# Patient Record
Sex: MALE
[De-identification: names, ages, dates, MRNs, and addresses within clinical notes are randomized per-mention and may not be internally consistent; named-entity substitution may affect disease eponyms.]

---

## 2024-02-04 ENCOUNTER — NON-APPOINTMENT (OUTPATIENT)
Age: 49
End: 2024-02-04

## 2024-02-05 ENCOUNTER — EMERGENCY (EMERGENCY)
Facility: HOSPITAL | Age: 49
LOS: 1 days | Discharge: HOME | End: 2024-02-05
Attending: EMERGENCY MEDICINE | Admitting: EMERGENCY MEDICINE
Payer: COMMERCIAL

## 2024-02-05 VITALS
HEART RATE: 86 BPM | RESPIRATION RATE: 18 BRPM | SYSTOLIC BLOOD PRESSURE: 174 MMHG | OXYGEN SATURATION: 98 % | DIASTOLIC BLOOD PRESSURE: 116 MMHG

## 2024-02-05 VITALS
OXYGEN SATURATION: 96 % | DIASTOLIC BLOOD PRESSURE: 146 MMHG | TEMPERATURE: 98 F | SYSTOLIC BLOOD PRESSURE: 222 MMHG | HEART RATE: 94 BPM | RESPIRATION RATE: 18 BRPM

## 2024-02-05 LAB
ALBUMIN SERPL ELPH-MCNC: 4.7 G/DL — SIGNIFICANT CHANGE UP (ref 3.3–5)
ALP SERPL-CCNC: 77 U/L — SIGNIFICANT CHANGE UP (ref 40–120)
ALT FLD-CCNC: 33 U/L — SIGNIFICANT CHANGE UP (ref 4–41)
ANION GAP SERPL CALC-SCNC: 12 MMOL/L — SIGNIFICANT CHANGE UP (ref 7–14)
AST SERPL-CCNC: 26 U/L — SIGNIFICANT CHANGE UP (ref 4–40)
BASOPHILS # BLD AUTO: 0.04 K/UL — SIGNIFICANT CHANGE UP (ref 0–0.2)
BASOPHILS NFR BLD AUTO: 0.6 % — SIGNIFICANT CHANGE UP (ref 0–2)
BILIRUB SERPL-MCNC: 0.4 MG/DL — SIGNIFICANT CHANGE UP (ref 0.2–1.2)
BUN SERPL-MCNC: 17 MG/DL — SIGNIFICANT CHANGE UP (ref 7–23)
CALCIUM SERPL-MCNC: 9.9 MG/DL — SIGNIFICANT CHANGE UP (ref 8.4–10.5)
CHLORIDE SERPL-SCNC: 103 MMOL/L — SIGNIFICANT CHANGE UP (ref 98–107)
CO2 SERPL-SCNC: 26 MMOL/L — SIGNIFICANT CHANGE UP (ref 22–31)
CREAT SERPL-MCNC: 0.92 MG/DL — SIGNIFICANT CHANGE UP (ref 0.5–1.3)
EGFR: 101 ML/MIN/1.73M2 — SIGNIFICANT CHANGE UP
EOSINOPHIL # BLD AUTO: 0.11 K/UL — SIGNIFICANT CHANGE UP (ref 0–0.5)
EOSINOPHIL NFR BLD AUTO: 1.7 % — SIGNIFICANT CHANGE UP (ref 0–6)
GLUCOSE SERPL-MCNC: 95 MG/DL — SIGNIFICANT CHANGE UP (ref 70–99)
HCT VFR BLD CALC: 43.6 % — SIGNIFICANT CHANGE UP (ref 39–50)
HGB BLD-MCNC: 15.2 G/DL — SIGNIFICANT CHANGE UP (ref 13–17)
IANC: 4.65 K/UL — SIGNIFICANT CHANGE UP (ref 1.8–7.4)
IMM GRANULOCYTES NFR BLD AUTO: 0.5 % — SIGNIFICANT CHANGE UP (ref 0–0.9)
LYMPHOCYTES # BLD AUTO: 1.24 K/UL — SIGNIFICANT CHANGE UP (ref 1–3.3)
LYMPHOCYTES # BLD AUTO: 19.2 % — SIGNIFICANT CHANGE UP (ref 13–44)
MCHC RBC-ENTMCNC: 29.6 PG — SIGNIFICANT CHANGE UP (ref 27–34)
MCHC RBC-ENTMCNC: 34.9 GM/DL — SIGNIFICANT CHANGE UP (ref 32–36)
MCV RBC AUTO: 85 FL — SIGNIFICANT CHANGE UP (ref 80–100)
MONOCYTES # BLD AUTO: 0.39 K/UL — SIGNIFICANT CHANGE UP (ref 0–0.9)
MONOCYTES NFR BLD AUTO: 6 % — SIGNIFICANT CHANGE UP (ref 2–14)
NEUTROPHILS # BLD AUTO: 4.65 K/UL — SIGNIFICANT CHANGE UP (ref 1.8–7.4)
NEUTROPHILS NFR BLD AUTO: 72 % — SIGNIFICANT CHANGE UP (ref 43–77)
NRBC # BLD: 0 /100 WBCS — SIGNIFICANT CHANGE UP (ref 0–0)
NRBC # FLD: 0 K/UL — SIGNIFICANT CHANGE UP (ref 0–0)
PLATELET # BLD AUTO: 302 K/UL — SIGNIFICANT CHANGE UP (ref 150–400)
POTASSIUM SERPL-MCNC: 3.8 MMOL/L — SIGNIFICANT CHANGE UP (ref 3.5–5.3)
POTASSIUM SERPL-SCNC: 3.8 MMOL/L — SIGNIFICANT CHANGE UP (ref 3.5–5.3)
PROT SERPL-MCNC: 8.7 G/DL — HIGH (ref 6–8.3)
RBC # BLD: 5.13 M/UL — SIGNIFICANT CHANGE UP (ref 4.2–5.8)
RBC # FLD: 12.8 % — SIGNIFICANT CHANGE UP (ref 10.3–14.5)
SODIUM SERPL-SCNC: 141 MMOL/L — SIGNIFICANT CHANGE UP (ref 135–145)
WBC # BLD: 6.46 K/UL — SIGNIFICANT CHANGE UP (ref 3.8–10.5)
WBC # FLD AUTO: 6.46 K/UL — SIGNIFICANT CHANGE UP (ref 3.8–10.5)

## 2024-02-05 PROCEDURE — 93010 ELECTROCARDIOGRAM REPORT: CPT

## 2024-02-05 PROCEDURE — 99285 EMERGENCY DEPT VISIT HI MDM: CPT

## 2024-02-05 RX ORDER — METOPROLOL TARTRATE 50 MG
25 TABLET ORAL ONCE
Refills: 0 | Status: COMPLETED | OUTPATIENT
Start: 2024-02-05 | End: 2024-02-05

## 2024-02-05 RX ORDER — HYDRALAZINE HCL 50 MG
10 TABLET ORAL ONCE
Refills: 0 | Status: COMPLETED | OUTPATIENT
Start: 2024-02-05 | End: 2024-02-05

## 2024-02-05 RX ORDER — AMLODIPINE BESYLATE 2.5 MG/1
1 TABLET ORAL
Qty: 30 | Refills: 0
Start: 2024-02-05 | End: 2024-03-05

## 2024-02-05 RX ORDER — METOPROLOL TARTRATE 50 MG
5 TABLET ORAL ONCE
Refills: 0 | Status: COMPLETED | OUTPATIENT
Start: 2024-02-05 | End: 2024-02-05

## 2024-02-05 RX ADMIN — Medication 10 MILLIGRAM(S): at 18:14

## 2024-02-05 RX ADMIN — Medication 5 MILLIGRAM(S): at 17:25

## 2024-02-05 RX ADMIN — Medication 25 MILLIGRAM(S): at 17:21

## 2024-02-05 NOTE — ED PROVIDER NOTE - OBJECTIVE STATEMENT
50 y/o male with pmh of vertigo presents to the ED for elevated blood pressure and uri symptoms. Patient has had nasal congestion, sore throat and cough for the last two weeks. He went to urgent care earlier today when he noted crusting and erythema in his eyes this morning. Vitals at urgent care showed elevated  systolics. Patient does not follow with a PMD. He believes he had an echo in 2017 while he was in the hospital but is unsure if he ever had a stress test. He does not regularly check his bp. Denies any medication use. HE denies blurry vision, change in vision, dizziness, headache, chest pain, sob, or any urinary symptoms.

## 2024-02-05 NOTE — ED ADULT TRIAGE NOTE - CHIEF COMPLAINT QUOTE
Sent from urgent care for elevated BP. Seen today for conjunctivitis and URI symptoms. States he's not on any BP meds. Denies cp, dizziness, headache or vision changes.

## 2024-02-05 NOTE — ED PROVIDER NOTE - NSFOLLOWUPINSTRUCTIONS_ED_ALL_ED_FT
You have been evaluated in the Emergency Department today for elevated blood pressure. Your evaluation does not warrant emergent intervention at this time.    We sent a referral for a primary care doctor. Please make an appointment as soon as possible to establish care.     You should keep a blood pressure diary with one reading a day (preferably around the same time everyday) to take with you when you see your doctor. This will give your doctor a better idea of what your baseline blood pressure is.     Return to the Emergency Department if you experience chest pain, shortness of breath, change in vision, blurry vision or any worsening symptoms. You have been evaluated in the Emergency Department today for elevated blood pressure. Your evaluation does not warrant emergent intervention at this time.    We sent a referral for a primary care doctor. Please make an appointment as soon as possible to establish care.     Please discontinue any medication with PHENYLEPHRINE as if used for more then 3 days, it may elevate your blood pressure.     You should keep a blood pressure diary with one reading a day (preferably around the same time everyday) to take with you when you see your doctor. This will give your doctor a better idea of what your baseline blood pressure is.     Return to the Emergency Department if you experience chest pain, shortness of breath, change in vision, blurry vision or any worsening symptoms. You have been evaluated in the Emergency Department today for elevated blood pressure. Your evaluation does not warrant emergent intervention at this time.    Amlodipine 10mg was sent to your pharmacy. Please take as prescribed, once at night.     We sent a referral for a primary care doctor. Please make an appointment as soon as possible to establish care.     Please discontinue any medication with PHENYLEPHRINE as if used for more then 3 days, it may elevate your blood pressure.     You should keep a blood pressure diary with one reading a day (preferably around the same time everyday) to take with you when you see your doctor. This will give your doctor a better idea of what your baseline blood pressure is.     Return to the Emergency Department if you experience chest pain, shortness of breath, change in vision, blurry vision or any worsening symptoms.

## 2024-02-05 NOTE — ED PROVIDER NOTE - PATIENT PORTAL LINK FT
You can access the FollowMyHealth Patient Portal offered by [facility] by registering at the following website: [email or web address]. By joining [employer]’s FollowMyHealth portal, you will also be able to view your health information using other applications (apps) compatible with our system. You can access the FollowMyHealth Patient Portal offered by [facility] by registering at the following website: [email or web address]. By joining [employer]’s FollowMyHealth portal, you will also be able to view your health information using other applications (apps) compatible with our system.

## 2024-02-05 NOTE — ED PROVIDER NOTE - CLINICAL SUMMARY MEDICAL DECISION MAKING FREE TEXT BOX
50 y/o male with pmh of vertigo presents to the ED for elevated blood pressure and uri symptoms. Patient has had nasal congestion, sore throat and cough for the last two weeks. He went to urgent care earlier today when he noted crusting and erythema in his eyes this morning. Vitals at urgent care showed elevated  systolics. Patient does not follow with a PMD. He believes he had an echo in 2017 while he was in the hospital but is unsure if he ever had a stress test. He does not regularly check his bp. Denies any medication use. HE denies blurry vision, change in vision, dizziness, headache, chest pain, sob, or any urinary symptoms.   Patient asymptomatic at this time. will get basic labs to check for end organ damage such as renal function.   will give patient referral for a PMD to establish care.

## 2024-02-05 NOTE — ED PROVIDER NOTE - PROGRESS NOTE DETAILS
Celsa Mendez PGY1: counseled patient on not taking medication with phenylephrine as it can raise BP. discussed importance of establishing care with PMD. patient verbalized that he understood.

## 2024-02-05 NOTE — ED ADULT NURSE NOTE - OBJECTIVE STATEMENT
Patient alert and oriented, bilateral eyes red, no visual changes noted. Complaining of mild headache, no extremity deficits noted, normal neuro exam. Noted to be hyperstensive systolic in the high 200, IV saline lock started, blood work drawn and sent to lab, due meds given as per providersinstructions, BP monitored lcosely.

## 2024-02-05 NOTE — ED PROVIDER NOTE - PHYSICAL EXAMINATION
Physical Exam:  Gen:  Well appearing, awake, alert, non-toxic appearing  Head: NCAT  HEENT: erythematous conjunctiva, trachea midline, moist mucous membranes  Lung: CTAB, no respiratory distress, no wheezes/rhonchi/rales B/L, speaking in full sentences  CV: RRR, no murmurs, rubs or gallops  Abd: Soft, non-tender, non-distended, no guarding   MSK: No visible deformities, moves all four extremities,  Neuro: No focal motor deficits  Skin: Warm, well perfused, no visible rashes,   Psych: appropriate affect and mood

## 2024-02-05 NOTE — ED PROVIDER NOTE - ATTENDING CONTRIBUTION TO CARE
I performed a face-to-face evaluation of the patient and performed a history and physical examination. I agree with the history and physical examination. If this was a PA visit, I personally saw the patient with the PA and performed a substantive portion of the visit including all aspects of the medical decision making.    Recent URI Sx. Has been taking phenylephrine-containing OTC URI meds x 2 wks. Went to urgent care today. Found to have SBP ~200. No Sx. Check EKG and Cr. Give BP-lowering meds here. Prescribe for home, but inform pt. that, if off phenylephrine, his BP improves, then no need for home BP meds.

## 2024-04-03 PROBLEM — Z78.9 OTHER SPECIFIED HEALTH STATUS: Chronic | Status: ACTIVE | Noted: 2024-02-05

## 2024-04-11 PROBLEM — Z00.00 ENCOUNTER FOR PREVENTIVE HEALTH EXAMINATION: Status: ACTIVE | Noted: 2024-04-11

## 2024-04-12 ENCOUNTER — APPOINTMENT (OUTPATIENT)
Dept: CARDIOLOGY | Facility: CLINIC | Age: 49
End: 2024-04-12
Payer: COMMERCIAL

## 2024-04-12 VITALS
BODY MASS INDEX: 21.09 KG/M2 | WEIGHT: 144 LBS | HEIGHT: 69.29 IN | RESPIRATION RATE: 18 BRPM | SYSTOLIC BLOOD PRESSURE: 190 MMHG | HEART RATE: 109 BPM | DIASTOLIC BLOOD PRESSURE: 144 MMHG | TEMPERATURE: 99.1 F | OXYGEN SATURATION: 94 %

## 2024-04-12 DIAGNOSIS — R94.31 ABNORMAL ELECTROCARDIOGRAM [ECG] [EKG]: ICD-10-CM

## 2024-04-12 DIAGNOSIS — R93.1 ABNORMAL FINDINGS ON DIAGNOSTIC IMAGING OF HEART AND CORONARY CIRCULATION: ICD-10-CM

## 2024-04-12 PROCEDURE — G2211 COMPLEX E/M VISIT ADD ON: CPT

## 2024-04-12 PROCEDURE — 99205 OFFICE O/P NEW HI 60 MIN: CPT

## 2024-04-14 VITALS — DIASTOLIC BLOOD PRESSURE: 140 MMHG | SYSTOLIC BLOOD PRESSURE: 190 MMHG

## 2024-04-14 PROBLEM — R94.31 ABNORMAL EKG: Status: ACTIVE | Noted: 2024-04-14

## 2024-04-14 PROBLEM — R93.1 ELEVATED CORONARY ARTERY CALCIUM SCORE: Status: ACTIVE | Noted: 2024-04-14

## 2024-04-14 NOTE — ASSESSMENT
[FreeTextEntry1] : 49 year old M  with elevated Ca score, HTN who presents for cardiac evaluation.  Pt was seen in  2/5/24 with "pink eye." BP noted to be 220/120, he was told to proceed to ED. In ED, pt had bloodwork checked, and was started on amlodipine 10mg daily and was discharged. He was seen by PCP Dr. Caneal who did a "physical" but no changes were made to management at the time. Currently he has no symptoms. He states he exercises 3-4 times a week with treadmill for 10-12 minutes at a time and weight lifting afterwards. No CP, SOB, orthopnea, PND, LE edema, dizziness, or LOC. He doesn't keep a low salt diet.  1) HTN, severe 2) Elevated Ca score 3) Abnormal EKG - EKG 2/23/24 showed sinus rhythm with ST-T wave abnormalities V3-V6, II, III, aVF - CT CAP 3/4/24 showed Ca score 549 (, Cx 9, )  - TTE 3/15/24 at outside facility showed normal LV function, with normal thicknesses, mild MR, grade 1-2 DD - Continue amlodipine 10mg daily - Start chlorthalidone 25mg daily - I advised pt to start atorvastatin 20mg daily, goal LDL 55-70 - Check labs today. R/O secondary causes of hypertension (marito, metanephrines, renal artery stenosis). - Pt has wrist BP cuff at home, will have remote BP RPM team follow along to help with good BP control - Once BP is controlled, will have patient undergo treadmill stress echo to r/o ischemia in setting of elevated Ca - Work clearance pending good BP control and stress echo  4) Follow-up, 1 month

## 2024-04-14 NOTE — HISTORY OF PRESENT ILLNESS
[FreeTextEntry1] : 49 year old M  with elevated Ca score, HTN who presents for cardiac evaluation.  CT CAP 3/4/24 showed Ca score 549 (, Cx 9, )  TTE 3/15/24 at outside facility showed normal LV function, with normal thicknesses, mild MR, grade 1-2 DD EKG 2/23/24 showed sinus rhythm with ST-T wave abnormalities V3-V6, II, III, aVF  Pt was seen in UC 2/5/24 with "pink eye." BP noted to be 220/120, he was told to proceed to ED. In ED, pt had bloodwork checked, and was started on amlodipine 10mg daily and was discharged. He was seen by PCP Dr. Canela who did a "physical" but no changes were made to management at the time. Currently he has no symptoms. He states he exercises 3-4 times a week with treadmill for 10-12 minutes at a time and weight lifting afterwards. No CP, SOB, orthopnea, PND, LE edema, dizziness, or LOC. He doesn't keep a low salt diet.

## 2024-04-15 LAB
ALBUMIN SERPL ELPH-MCNC: 4.7 G/DL
ALP BLD-CCNC: 84 U/L
ALT SERPL-CCNC: 17 U/L
ANION GAP SERPL CALC-SCNC: 15 MMOL/L
AST SERPL-CCNC: 16 U/L
BASOPHILS # BLD AUTO: 0.06 K/UL
BASOPHILS NFR BLD AUTO: 1 %
BILIRUB SERPL-MCNC: 0.3 MG/DL
BUN SERPL-MCNC: 18 MG/DL
CALCIUM SERPL-MCNC: 10.3 MG/DL
CHLORIDE SERPL-SCNC: 106 MMOL/L
CHOLEST SERPL-MCNC: 208 MG/DL
CO2 SERPL-SCNC: 24 MMOL/L
CREAT SERPL-MCNC: 1.25 MG/DL
EGFR: 70 ML/MIN/1.73M2
EOSINOPHIL # BLD AUTO: 0.2 K/UL
EOSINOPHIL NFR BLD AUTO: 3.4 %
ESTIMATED AVERAGE GLUCOSE: 105 MG/DL
GLUCOSE SERPL-MCNC: 101 MG/DL
HBA1C MFR BLD HPLC: 5.3 %
HCT VFR BLD CALC: 47.9 %
HDLC SERPL-MCNC: 38 MG/DL
HGB BLD-MCNC: 15.6 G/DL
IMM GRANULOCYTES NFR BLD AUTO: 0.5 %
LDLC SERPL CALC-MCNC: 107 MG/DL
LYMPHOCYTES # BLD AUTO: 1.09 K/UL
LYMPHOCYTES NFR BLD AUTO: 18.3 %
MAN DIFF?: NORMAL
MCHC RBC-ENTMCNC: 29.9 PG
MCHC RBC-ENTMCNC: 32.6 GM/DL
MCV RBC AUTO: 91.9 FL
MONOCYTES # BLD AUTO: 0.34 K/UL
MONOCYTES NFR BLD AUTO: 5.7 %
NEUTROPHILS # BLD AUTO: 4.25 K/UL
NEUTROPHILS NFR BLD AUTO: 71.1 %
NONHDLC SERPL-MCNC: 170 MG/DL
NT-PROBNP SERPL-MCNC: 267 PG/ML
PLATELET # BLD AUTO: 297 K/UL
POTASSIUM SERPL-SCNC: 4.2 MMOL/L
PROT SERPL-MCNC: 7.9 G/DL
RBC # BLD: 5.21 M/UL
RBC # FLD: 14.2 %
SODIUM SERPL-SCNC: 145 MMOL/L
TRIGL SERPL-MCNC: 369 MG/DL
TSH SERPL-ACNC: 0.95 UIU/ML
WBC # FLD AUTO: 5.97 K/UL

## 2024-04-15 RX ORDER — ATORVASTATIN CALCIUM 20 MG/1
20 TABLET, FILM COATED ORAL
Qty: 90 | Refills: 1 | Status: ACTIVE | COMMUNITY
Start: 2024-04-15 | End: 1900-01-01

## 2024-04-17 ENCOUNTER — APPOINTMENT (OUTPATIENT)
Dept: CARE COORDINATION | Facility: CLINIC | Age: 49
End: 2024-04-17
Payer: COMMERCIAL

## 2024-04-17 DIAGNOSIS — E78.5 HYPERLIPIDEMIA, UNSPECIFIED: ICD-10-CM

## 2024-04-17 PROCEDURE — ZZZZZ: CPT

## 2024-04-17 PROCEDURE — 99214 OFFICE O/P EST MOD 30 MIN: CPT

## 2024-04-17 RX ORDER — AMLODIPINE BESYLATE 10 MG/1
10 TABLET ORAL DAILY
Qty: 30 | Refills: 0 | Status: ACTIVE | COMMUNITY
Start: 2024-04-17

## 2024-04-17 NOTE — PHYSICAL EXAM
[No Acute Distress] : no acute distress [Well-Appearing] : well-appearing [No Respiratory Distress] : no respiratory distress  [Speech Grossly Normal] : speech grossly normal [Normal Affect] : the affect was normal [Alert and Oriented x3] : oriented to person, place, and time [Normal Mood] : the mood was normal

## 2024-04-17 NOTE — HEALTH RISK ASSESSMENT
[Yes] : Yes [Monthly or less (1 pt)] : Monthly or less (1 point) [3 or 4 (1 pt)] : 3 or 4  (1 point) [Never (0 pts)] : Never (0 points) [Little interest or pleasure doing things] : 1) Little interest or pleasure doing things [Feeling down, depressed, or hopeless] : 2) Feeling down, depressed, or hopeless [0] : 2) Feeling down, depressed, or hopeless: Not at all (0) [PHQ-2 Negative - No further assessment needed] : PHQ-2 Negative - No further assessment needed [Audit-CScore] : 2 [[id number]] : 0

## 2024-04-17 NOTE — REVIEW OF SYSTEMS
[Fatigue] : no fatigue [Chest Pain] : no chest pain [Palpitations] : no palpitations [Lower Ext Edema] : no lower extremity edema [Orthopena] : no orthopnea [Paroxysmal Nocturnal Dyspnea] : no paroxysmal nocturnal dyspnea [Shortness Of Breath] : no shortness of breath [Dyspnea on Exertion] : not dyspnea on exertion [Dizziness] : no dizziness

## 2024-04-17 NOTE — HISTORY OF PRESENT ILLNESS
[Home] : at home, [unfilled] , at the time of the visit. [Medical Office: ([facility])___] : at the medical office located in  [Verbal consent obtained from patient] : the patient, [unfilled] [FreeTextEntry1] : HAFSA VELASQUEZ is a 49 year male  with history of   HLD and uncontrolled hypertension   and is referred by his Cardiologist Dr Stallings.  Current blood pressure medication(s) include(s) Amlodipine 10mg daily and Chlorthalidone 25mg daily. Discussed blood pressure ranges and goal (less than 130/80).   The patient is a candidate for remote physiologic monitoring (RPM) and will be enrolled into the RPM program for blood pressure monitoring and medication management. Education on the RPM program & device was provided to the patient. Answered all questions and concerns.     Nutrition: Eats home cooked meals but does eat out during working hours. 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      Restorative Sleep: Sleeps 7-8 hrs during the day (works at night)     Physical Activity: Works out at the gym 3-4 days per week for at least 30minutes.Does a combination of cardio and strength training.      Social Connection: Lives with spouse and daughter. Socializes with friends and family.      Stress Management: Communicates with spouse and the Gym routine helps with stressors      SDOH: does not report any difficulties or barriers to obtaining health care.     Risky Substances: Denies Tobacco.  Drinks occasionally at social events  no anemia, no easy bruising, no jaundice, no swollen lymph nodes.

## 2024-04-17 NOTE — PLAN
[FreeTextEntry1] :   Patient with high blood pressure readings not at goal and would benefit from remote monitoring and management to improve high blood pressure and overall lifestyle.  1. Start RPM. Assess for need for further lifestyle changes vs medication  2. Patient oriented to RPM program including twice daily measurements, escalations, regular monitoring and touch bases as well as possible need to start and/or titrate medications  3. Patient has had appropriate screenings.  4. Medication Plan:  Continue current medication, will reevaluate with data collection.  5. Nutrition - Start Low sodium diet and incorporate suggestions   6. Restorative sleep - continue current sleep hygiene  7. Physical Activity - continue current level of activity  8. Stress Management - continue current stress management approaches  9. SDOH - does not report any difficulties or contributing factors to obtaining health care.  10. Social Connection - continue associations with family and friends  11. Avoidance of Risky substances - continue to avoid

## 2024-04-17 NOTE — ASSESSMENT
[FreeTextEntry1] : HAFSA VELASQUEZ is a 49 year yo male with history as discribed who has had in office high blood pressure readings above a target of BP<130/90. He is motivated to be healthy and understand/improve his blood pressure status.   Nutrition - Needs improvement.  advised to try adding Fruits and vegetables/ salads to meals during working hours to avoid unhealthy meals choices. Low Sodium diet   Restorative sleep - adequate   Physical Activity - adequate  Stress Management - adequate  SDOH - does not report any difficulties or contributing factors to obtaining health care.  Social Connection - adequate  Avoidance of Risky substances - adequate

## 2024-04-21 LAB
ALDOSTERONE/RENIN RATIO: 2.1 RATIO
METANEPHRINE, PL: 29.3 PG/ML
NORMETANEPHRINE, PL: 291.5 PG/ML

## 2024-04-30 VITALS — SYSTOLIC BLOOD PRESSURE: 126 MMHG | DIASTOLIC BLOOD PRESSURE: 82 MMHG | HEART RATE: 89 BPM

## 2024-05-01 ENCOUNTER — APPOINTMENT (OUTPATIENT)
Dept: CARE COORDINATION | Facility: CLINIC | Age: 49
End: 2024-05-01
Payer: COMMERCIAL

## 2024-05-01 PROCEDURE — 99457 RPM TX MGMT 1ST 20 MIN: CPT

## 2024-05-03 ENCOUNTER — NON-APPOINTMENT (OUTPATIENT)
Age: 49
End: 2024-05-03

## 2024-05-10 ENCOUNTER — APPOINTMENT (OUTPATIENT)
Dept: CARDIOLOGY | Facility: CLINIC | Age: 49
End: 2024-05-10
Payer: COMMERCIAL

## 2024-05-10 VITALS
SYSTOLIC BLOOD PRESSURE: 144 MMHG | DIASTOLIC BLOOD PRESSURE: 88 MMHG | HEART RATE: 87 BPM | RESPIRATION RATE: 18 BRPM | OXYGEN SATURATION: 97 %

## 2024-05-10 PROCEDURE — 93320 DOPPLER ECHO COMPLETE: CPT

## 2024-05-10 PROCEDURE — 93325 DOPPLER ECHO COLOR FLOW MAPG: CPT

## 2024-05-10 PROCEDURE — 93351 STRESS TTE COMPLETE: CPT

## 2024-05-10 RX ORDER — CHLORTHALIDONE 25 MG/1
25 TABLET ORAL
Qty: 90 | Refills: 1 | Status: ACTIVE | COMMUNITY
Start: 2024-04-12 | End: 1900-01-01

## 2024-05-13 ENCOUNTER — TRANSCRIPTION ENCOUNTER (OUTPATIENT)
Age: 49
End: 2024-05-13

## 2024-05-13 ENCOUNTER — APPOINTMENT (OUTPATIENT)
Dept: INTERNAL MEDICINE | Facility: CLINIC | Age: 49
End: 2024-05-13
Payer: COMMERCIAL

## 2024-05-13 DIAGNOSIS — M10.9 GOUT, UNSPECIFIED: ICD-10-CM

## 2024-05-13 PROCEDURE — 99213 OFFICE O/P EST LOW 20 MIN: CPT

## 2024-05-13 RX ORDER — PREDNISONE 20 MG/1
20 TABLET ORAL DAILY
Qty: 40 | Refills: 0 | Status: ACTIVE | COMMUNITY
Start: 2024-05-13 | End: 1900-01-01

## 2024-05-13 NOTE — REVIEW OF SYSTEMS
[Negative] : Heme/Lymph [FreeTextEntry9] : right big toe swelling and pain as well as redness and feels warm

## 2024-05-13 NOTE — DATA REVIEWED
[FreeTextEntry1] : Stress Echo- 5/9/24 1. Normal exercise stress echocardiogram. with normal augmentation of left ventricular systolic function. 2. Abnormal ECG response to stress. 3. No echocardiographic evidence of exercise induced ischemia. 4. The Duke Treadmill Score is 2.50, which is consistent with intermediate risk (-10 to <5) for future cardiac events. 5. Left ventricular systolic function is normal with an ejection fraction visually estimated at 65 to 70 %. 6. Normal left ventricular diastolic function. 7. There is mild-moderate basal septal hypertrophy (1.4 cm). 8. Normal right ventricular cavity size and normal systolic function. 9. Minimal to mild mitral regurgitation. 10. Trace tricuspid regurgitation. 11. Trace aortic regurgitation.

## 2024-05-13 NOTE — ASSESSMENT
[FreeTextEntry1] : 1- Gout flare- right big toe- had similar episodes- 3-4 per year- usually takes indomethacin- in view of borderline kidney fxn and prior Hx of severe HTN, will opt for prednisone- prednisone 40 mg po daily until flare up subsides.  2-HTN- seems under better control- FU with PCP and cardiologist.

## 2024-05-13 NOTE — HISTORY OF PRESENT ILLNESS
[FreeTextEntry8] : This visit was provided via telehealth using real-time 2-way audio visual technology.. Verbal consent given on 05/13/2024 at 09:53 by HAFSA VELASQUEZ, ~  ~. Patient is at his home and provider at the office.  49 yrs old male with severe HTN, elevated Calcium score, abn EKG, seen in April by Cardiology when chlortalidone was added, presents with Pain, redness and warmth to right big toe and dorsal distal aspect of foot under 2nd and 3 rd toes for 1 day now. Says it is very painful to walk, couldn't go to work. ptn has had similar episodes in the past and used to take Indomethacin for his gout flares. Also his prior gout flares were similar to the one he has now. Ptn states that his BP is better, this morning it was 117/89. Ptn denies any CP, SOB or palp- No GI or  sxs, no fever or chills. Ptn also reports that he had a stress test 3 days ago and was told it was ok.

## 2024-05-13 NOTE — PHYSICAL EXAM
[Normal] : no acute distress, well nourished, well developed and well-appearing [No Respiratory Distress] : no respiratory distress  [No Accessory Muscle Use] : no accessory muscle use [de-identified] : 117/89 this am [de-identified] : video: swollen MTP of right big toe extending to plantar aspect and extending Dorsally under 2nd and 3 rd toes- tender to touch as per ptn and can see redness to purplish discoloration-also warm to touch as per ptn

## 2024-05-23 ENCOUNTER — NON-APPOINTMENT (OUTPATIENT)
Age: 49
End: 2024-05-23

## 2024-05-27 NOTE — ASSESSMENT
[FreeTextEntry1] : See summary report dated 5/1/2024 for synchronous communication and billable hours report dated 5/1/2024 for total minutes.   [employer] data reviewed; patient not at goal with interactive encounters and monitoring. Correct blood pressure cuff size is confirmed. Will continue to review medications and times, nutrition, sleep hygiene, physical activity, stress management, social connections, and avoidance of risky substances. Plan is to continue to monitor blood pressure and adjust medications as indicated.

## 2024-05-31 VITALS — SYSTOLIC BLOOD PRESSURE: 122 MMHG | HEART RATE: 55 BPM | DIASTOLIC BLOOD PRESSURE: 72 MMHG

## 2024-06-01 ENCOUNTER — APPOINTMENT (OUTPATIENT)
Dept: CARE COORDINATION | Facility: CLINIC | Age: 49
End: 2024-06-01

## 2024-06-01 DIAGNOSIS — I10 ESSENTIAL (PRIMARY) HYPERTENSION: ICD-10-CM

## 2024-06-01 PROCEDURE — 99454 REM MNTR PHYSIOL PARAM 16-30: CPT

## 2024-06-01 PROCEDURE — 99457 RPM TX MGMT 1ST 20 MIN: CPT

## 2024-06-03 PROBLEM — I10 ESSENTIAL HYPERTENSION: Status: ACTIVE | Noted: 2024-04-12

## 2024-06-03 NOTE — ASSESSMENT
[FreeTextEntry1] : See summary report dated 6/3/2024 for synchronous communication and billable hours report dated 6/3/2024 for total minutes.  See RPM 30 Day Vital Signs Report for vital transmission.

## 2024-06-19 ENCOUNTER — NON-APPOINTMENT (OUTPATIENT)
Age: 49
End: 2024-06-19

## 2024-07-15 ENCOUNTER — APPOINTMENT (OUTPATIENT)
Dept: INTERNAL MEDICINE | Facility: CLINIC | Age: 49
End: 2024-07-15

## 2024-10-11 ENCOUNTER — APPOINTMENT (OUTPATIENT)
Dept: CARDIOLOGY | Facility: CLINIC | Age: 49
End: 2024-10-11

## 2024-11-22 ENCOUNTER — APPOINTMENT (OUTPATIENT)
Dept: CARDIOLOGY | Facility: CLINIC | Age: 49
End: 2024-11-22

## 2024-12-20 ENCOUNTER — APPOINTMENT (OUTPATIENT)
Dept: CARDIOLOGY | Facility: CLINIC | Age: 49
End: 2024-12-20

## 2025-01-10 ENCOUNTER — APPOINTMENT (OUTPATIENT)
Dept: INTERNAL MEDICINE | Facility: CLINIC | Age: 50
End: 2025-01-10

## 2025-01-17 ENCOUNTER — NON-APPOINTMENT (OUTPATIENT)
Age: 50
End: 2025-01-17

## 2025-01-17 ENCOUNTER — APPOINTMENT (OUTPATIENT)
Dept: CARDIOLOGY | Facility: CLINIC | Age: 50
End: 2025-01-17
Payer: COMMERCIAL

## 2025-01-17 VITALS — SYSTOLIC BLOOD PRESSURE: 159 MMHG | DIASTOLIC BLOOD PRESSURE: 122 MMHG

## 2025-01-17 VITALS
DIASTOLIC BLOOD PRESSURE: 120 MMHG | BODY MASS INDEX: 21.97 KG/M2 | RESPIRATION RATE: 18 BRPM | SYSTOLIC BLOOD PRESSURE: 173 MMHG | HEART RATE: 95 BPM | WEIGHT: 150 LBS | OXYGEN SATURATION: 97 %

## 2025-01-17 DIAGNOSIS — I10 ESSENTIAL (PRIMARY) HYPERTENSION: ICD-10-CM

## 2025-01-17 DIAGNOSIS — E78.5 HYPERLIPIDEMIA, UNSPECIFIED: ICD-10-CM

## 2025-01-17 DIAGNOSIS — R93.1 ABNORMAL FINDINGS ON DIAGNOSTIC IMAGING OF HEART AND CORONARY CIRCULATION: ICD-10-CM

## 2025-01-17 PROCEDURE — 99214 OFFICE O/P EST MOD 30 MIN: CPT

## 2025-01-17 PROCEDURE — 93000 ELECTROCARDIOGRAM COMPLETE: CPT

## 2025-01-17 PROCEDURE — G2211 COMPLEX E/M VISIT ADD ON: CPT | Mod: NC

## 2025-01-21 LAB
ALBUMIN SERPL ELPH-MCNC: 4.5 G/DL
ALP BLD-CCNC: 66 U/L
ALT SERPL-CCNC: 18 U/L
ANION GAP SERPL CALC-SCNC: 14 MMOL/L
AST SERPL-CCNC: 17 U/L
BILIRUB SERPL-MCNC: 0.4 MG/DL
BUN SERPL-MCNC: 15 MG/DL
CALCIUM SERPL-MCNC: 10.1 MG/DL
CHLORIDE SERPL-SCNC: 102 MMOL/L
CHOLEST SERPL-MCNC: 236 MG/DL
CO2 SERPL-SCNC: 28 MMOL/L
CREAT SERPL-MCNC: 1.21 MG/DL
EGFR: 72 ML/MIN/1.73M2
ESTIMATED AVERAGE GLUCOSE: 120 MG/DL
GLUCOSE SERPL-MCNC: 87 MG/DL
HBA1C MFR BLD HPLC: 5.8 %
HDLC SERPL-MCNC: 38 MG/DL
LDLC SERPL CALC-MCNC: 134 MG/DL
NONHDLC SERPL-MCNC: 198 MG/DL
NT-PROBNP SERPL-MCNC: <36 PG/ML
POTASSIUM SERPL-SCNC: 3 MMOL/L
PROT SERPL-MCNC: 7.5 G/DL
SODIUM SERPL-SCNC: 144 MMOL/L
TRIGL SERPL-MCNC: 356 MG/DL
TSH SERPL-ACNC: 1.67 UIU/ML

## 2025-01-21 RX ORDER — POTASSIUM CHLORIDE 1500 MG/1
20 TABLET, EXTENDED RELEASE ORAL DAILY
Qty: 30 | Refills: 1 | Status: ACTIVE | COMMUNITY
Start: 2025-01-21 | End: 1900-01-01

## 2025-02-08 ENCOUNTER — NON-APPOINTMENT (OUTPATIENT)
Age: 50
End: 2025-02-08

## 2025-02-24 ENCOUNTER — APPOINTMENT (OUTPATIENT)
Dept: INTERNAL MEDICINE | Facility: CLINIC | Age: 50
End: 2025-02-24
Payer: COMMERCIAL

## 2025-02-24 VITALS
SYSTOLIC BLOOD PRESSURE: 138 MMHG | HEIGHT: 69.29 IN | WEIGHT: 146 LBS | DIASTOLIC BLOOD PRESSURE: 94 MMHG | HEART RATE: 93 BPM | BODY MASS INDEX: 21.38 KG/M2 | OXYGEN SATURATION: 98 %

## 2025-02-24 DIAGNOSIS — E87.6 HYPOKALEMIA: ICD-10-CM

## 2025-02-24 DIAGNOSIS — Z11.59 ENCOUNTER FOR SCREENING FOR OTHER VIRAL DISEASES: ICD-10-CM

## 2025-02-24 DIAGNOSIS — Z12.5 ENCOUNTER FOR SCREENING FOR MALIGNANT NEOPLASM OF PROSTATE: ICD-10-CM

## 2025-02-24 DIAGNOSIS — Z00.00 ENCOUNTER FOR GENERAL ADULT MEDICAL EXAMINATION W/OUT ABNORMAL FINDINGS: ICD-10-CM

## 2025-02-24 DIAGNOSIS — Z12.11 ENCOUNTER FOR SCREENING FOR MALIGNANT NEOPLASM OF COLON: ICD-10-CM

## 2025-02-24 PROCEDURE — 99386 PREV VISIT NEW AGE 40-64: CPT

## 2025-02-26 DIAGNOSIS — M10.9 GOUT, UNSPECIFIED: ICD-10-CM

## 2025-02-26 LAB
ANION GAP SERPL CALC-SCNC: 16 MMOL/L
BUN SERPL-MCNC: 13 MG/DL
CALCIUM SERPL-MCNC: 10.2 MG/DL
CHLORIDE SERPL-SCNC: 100 MMOL/L
CO2 SERPL-SCNC: 27 MMOL/L
CREAT SERPL-MCNC: 1.09 MG/DL
EGFR: 82 ML/MIN/1.73M2
GLUCOSE SERPL-MCNC: 95 MG/DL
HCV AB SER QL: NONREACTIVE
HCV S/CO RATIO: 0.11 S/CO
HIV1+2 AB SPEC QL IA.RAPID: NONREACTIVE
POTASSIUM SERPL-SCNC: 3.3 MMOL/L
PSA SERPL-MCNC: 1.1 NG/ML
SODIUM SERPL-SCNC: 144 MMOL/L
URATE SERPL-MCNC: 10.4 MG/DL

## 2025-02-26 RX ORDER — ALLOPURINOL 100 MG/1
100 TABLET ORAL
Qty: 2 | Refills: 0 | Status: ACTIVE | COMMUNITY
Start: 2025-02-26 | End: 1900-01-01

## 2025-02-26 RX ORDER — INDOMETHACIN 25 MG/1
25 CAPSULE ORAL
Qty: 90 | Refills: 1 | Status: ACTIVE | COMMUNITY
Start: 2025-02-26 | End: 1900-01-01

## 2025-03-07 ENCOUNTER — TRANSCRIPTION ENCOUNTER (OUTPATIENT)
Age: 50
End: 2025-03-07

## 2025-03-21 ENCOUNTER — APPOINTMENT (OUTPATIENT)
Dept: CARDIOLOGY | Facility: CLINIC | Age: 50
End: 2025-03-21

## 2025-03-24 ENCOUNTER — NON-APPOINTMENT (OUTPATIENT)
Age: 50
End: 2025-03-24

## 2025-05-24 ENCOUNTER — RX RENEWAL (OUTPATIENT)
Age: 50
End: 2025-05-24

## 2025-07-15 ENCOUNTER — RX RENEWAL (OUTPATIENT)
Age: 50
End: 2025-07-15

## 2025-08-27 ENCOUNTER — APPOINTMENT (OUTPATIENT)
Dept: INTERNAL MEDICINE | Facility: CLINIC | Age: 50
End: 2025-08-27

## 2025-09-09 ENCOUNTER — RX RENEWAL (OUTPATIENT)
Age: 50
End: 2025-09-09

## 2025-09-11 ENCOUNTER — NON-APPOINTMENT (OUTPATIENT)
Age: 50
End: 2025-09-11

## 2025-09-12 ENCOUNTER — APPOINTMENT (OUTPATIENT)
Dept: CARDIOLOGY | Facility: CLINIC | Age: 50
End: 2025-09-12
Payer: COMMERCIAL

## 2025-09-12 VITALS
SYSTOLIC BLOOD PRESSURE: 139 MMHG | HEART RATE: 90 BPM | RESPIRATION RATE: 18 BRPM | BODY MASS INDEX: 22.11 KG/M2 | DIASTOLIC BLOOD PRESSURE: 97 MMHG | OXYGEN SATURATION: 96 % | WEIGHT: 151 LBS

## 2025-09-12 DIAGNOSIS — R93.1 ABNORMAL FINDINGS ON DIAGNOSTIC IMAGING OF HEART AND CORONARY CIRCULATION: ICD-10-CM

## 2025-09-12 DIAGNOSIS — I10 ESSENTIAL (PRIMARY) HYPERTENSION: ICD-10-CM

## 2025-09-12 DIAGNOSIS — R94.31 ABNORMAL ELECTROCARDIOGRAM [ECG] [EKG]: ICD-10-CM

## 2025-09-12 DIAGNOSIS — E78.5 HYPERLIPIDEMIA, UNSPECIFIED: ICD-10-CM

## 2025-09-12 PROCEDURE — 93000 ELECTROCARDIOGRAM COMPLETE: CPT

## 2025-09-12 PROCEDURE — 99215 OFFICE O/P EST HI 40 MIN: CPT

## 2025-09-12 PROCEDURE — G2211 COMPLEX E/M VISIT ADD ON: CPT | Mod: NC

## 2025-09-12 RX ORDER — ROSUVASTATIN CALCIUM 20 MG/1
20 TABLET, FILM COATED ORAL DAILY
Qty: 90 | Refills: 1 | Status: ACTIVE | COMMUNITY
Start: 2025-09-12 | End: 1900-01-01

## 2025-09-15 ENCOUNTER — NON-APPOINTMENT (OUTPATIENT)
Age: 50
End: 2025-09-15

## 2025-09-15 LAB
ALBUMIN SERPL ELPH-MCNC: 4.6 G/DL
ALP BLD-CCNC: 56 U/L
ALT SERPL-CCNC: 17 U/L
ANION GAP SERPL CALC-SCNC: 15 MMOL/L
AST SERPL-CCNC: 19 U/L
BILIRUB SERPL-MCNC: 0.5 MG/DL
BUN SERPL-MCNC: 16 MG/DL
CALCIUM SERPL-MCNC: 9.9 MG/DL
CHLORIDE SERPL-SCNC: 101 MMOL/L
CHOLEST SERPL-MCNC: 174 MG/DL
CO2 SERPL-SCNC: 26 MMOL/L
CREAT SERPL-MCNC: 1.09 MG/DL
EGFRCR SERPLBLD CKD-EPI 2021: 82 ML/MIN/1.73M2
ESTIMATED AVERAGE GLUCOSE: 108 MG/DL
GLUCOSE SERPL-MCNC: 116 MG/DL
HBA1C MFR BLD HPLC: 5.4 %
HDLC SERPL-MCNC: 31 MG/DL
LDLC SERPL-MCNC: 101 MG/DL
NONHDLC SERPL-MCNC: 143 MG/DL
NT-PROBNP SERPL-MCNC: <36 PG/ML
POTASSIUM SERPL-SCNC: 3 MMOL/L
PROT SERPL-MCNC: 7.3 G/DL
SODIUM SERPL-SCNC: 143 MMOL/L
TRIGL SERPL-MCNC: 239 MG/DL
TSH SERPL-ACNC: 1.3 UIU/ML